# Patient Record
Sex: FEMALE | Race: WHITE | NOT HISPANIC OR LATINO | URBAN - METROPOLITAN AREA
[De-identification: names, ages, dates, MRNs, and addresses within clinical notes are randomized per-mention and may not be internally consistent; named-entity substitution may affect disease eponyms.]

---

## 2023-04-22 ENCOUNTER — APPOINTMENT (RX ONLY)
Dept: URBAN - METROPOLITAN AREA CLINIC 156 | Facility: CLINIC | Age: 88
Setting detail: DERMATOLOGY
End: 2023-04-22

## 2023-04-22 VITALS — WEIGHT: 136 LBS | HEIGHT: 67 IN

## 2023-04-22 DIAGNOSIS — M31.0 HYPERSENSITIVITY ANGIITIS: ICD-10-CM | Status: RESOLVED

## 2023-04-22 PROCEDURE — 99202 OFFICE O/P NEW SF 15 MIN: CPT

## 2023-04-22 PROCEDURE — ? COUNSELING

## 2023-04-22 PROCEDURE — ? DIAGNOSIS COMMENT

## 2023-04-22 ASSESSMENT — LOCATION ZONE DERM: LOCATION ZONE: LEG

## 2023-04-22 ASSESSMENT — LOCATION DETAILED DESCRIPTION DERM
LOCATION DETAILED: LEFT ANKLE
LOCATION DETAILED: RIGHT ANKLE

## 2023-04-22 ASSESSMENT — LOCATION SIMPLE DESCRIPTION DERM
LOCATION SIMPLE: LEFT ANKLE
LOCATION SIMPLE: RIGHT ANKLE

## 2023-04-22 NOTE — HPI: RASH
What Type Of Note Output Would You Prefer (Optional)?: Standard Output
Is The Patient Presenting As Previously Scheduled?: Yes
How Severe Is Your Rash?: moderate
Is This A New Presentation, Or A Follow-Up?: Rash
Additional History: Patient was referred by Rheumatologist, Dr. Sales.

## 2023-04-22 NOTE — PROCEDURE: DIAGNOSIS COMMENT
Render Risk Assessment In Note?: no
Comment: Spoke with Dr Sales yesterday about patient.  Rash has resolved on medrol.  Nothing to biopsy today but wrote down Niurka and Taty’s name to call office if recurs as she needs to be worked in with me ASAP if resolves for punch biopsy and DIF.  Dr Sales thinks it will recur as RF is increasing.
Detail Level: Simple